# Patient Record
Sex: FEMALE | Race: WHITE | NOT HISPANIC OR LATINO | Employment: UNEMPLOYED | URBAN - NONMETROPOLITAN AREA
[De-identification: names, ages, dates, MRNs, and addresses within clinical notes are randomized per-mention and may not be internally consistent; named-entity substitution may affect disease eponyms.]

---

## 2023-08-23 PROBLEM — I10 ESSENTIAL HYPERTENSION: Status: ACTIVE | Noted: 2023-08-23

## 2023-08-23 PROBLEM — M54.16 RADICULOPATHY, LUMBAR REGION: Status: ACTIVE | Noted: 2023-08-23

## 2023-08-23 PROBLEM — R11.0 NAUSEA IN ADULT: Status: ACTIVE | Noted: 2023-08-23

## 2023-08-23 PROBLEM — E05.90 HYPERTHYROIDISM: Status: ACTIVE | Noted: 2023-08-23

## 2023-08-23 PROBLEM — N28.9 DECREASED RENAL FUNCTION: Status: ACTIVE | Noted: 2023-08-23

## 2023-08-23 PROBLEM — K59.09 CHRONIC CONSTIPATION: Status: ACTIVE | Noted: 2023-08-23

## 2023-08-23 PROBLEM — E78.5 HYPERLIPIDEMIA: Status: ACTIVE | Noted: 2023-08-23

## 2023-08-23 PROBLEM — M47.816 SPONDYLOSIS WITHOUT MYELOPATHY OR RADICULOPATHY, LUMBAR REGION: Status: ACTIVE | Noted: 2023-08-23

## 2023-08-23 PROBLEM — R73.03 PREDIABETES: Status: ACTIVE | Noted: 2023-08-23

## 2023-08-23 PROBLEM — R41.89 COGNITIVE IMPAIRMENT: Status: ACTIVE | Noted: 2023-08-23

## 2023-08-23 PROBLEM — N95.2 ATROPHIC VULVOVAGINITIS: Status: ACTIVE | Noted: 2023-08-23

## 2023-08-23 RX ORDER — METFORMIN HYDROCHLORIDE 500 MG/1
500 TABLET ORAL EVERY MORNING
COMMUNITY
End: 2023-10-16 | Stop reason: WASHOUT

## 2023-08-23 RX ORDER — ACETAMINOPHEN 500 MG
1 TABLET ORAL 2 TIMES DAILY
COMMUNITY
End: 2023-10-16 | Stop reason: WASHOUT

## 2023-08-23 RX ORDER — GLIPIZIDE 2.5 MG/1
2.5 TABLET, EXTENDED RELEASE ORAL EVERY EVENING
COMMUNITY
End: 2023-10-16 | Stop reason: WASHOUT

## 2023-08-23 RX ORDER — TRAMADOL HYDROCHLORIDE 50 MG/1
100 TABLET ORAL 3 TIMES DAILY PRN
COMMUNITY
End: 2024-03-28 | Stop reason: WASHOUT

## 2023-08-23 RX ORDER — METHIMAZOLE 5 MG/1
5 TABLET ORAL
COMMUNITY
Start: 2019-01-08 | End: 2023-10-16 | Stop reason: WASHOUT

## 2023-08-23 RX ORDER — METHIMAZOLE 5 MG/1
5 TABLET ORAL 3 TIMES DAILY
COMMUNITY
End: 2024-05-08

## 2023-08-23 RX ORDER — PRAVASTATIN SODIUM 80 MG/1
80 TABLET ORAL DAILY
COMMUNITY
End: 2024-05-08

## 2023-08-23 RX ORDER — DONEPEZIL HYDROCHLORIDE 5 MG/1
5 TABLET, FILM COATED ORAL NIGHTLY
COMMUNITY
End: 2023-10-16 | Stop reason: WASHOUT

## 2023-08-23 RX ORDER — NAPROXEN SODIUM 220 MG/1
1 TABLET, FILM COATED ORAL DAILY
COMMUNITY
End: 2024-04-29 | Stop reason: WASHOUT

## 2023-08-23 RX ORDER — METFORMIN HYDROCHLORIDE 500 MG/1
1000 TABLET ORAL EVERY EVENING
COMMUNITY
End: 2023-10-16 | Stop reason: WASHOUT

## 2023-08-23 RX ORDER — LISINOPRIL 20 MG/1
20 TABLET ORAL DAILY
COMMUNITY
Start: 2010-09-01 | End: 2023-10-16 | Stop reason: SDUPTHER

## 2023-08-23 RX ORDER — PANTOPRAZOLE SODIUM 40 MG/1
40 TABLET, DELAYED RELEASE ORAL
COMMUNITY
Start: 2021-08-25 | End: 2023-10-16 | Stop reason: WASHOUT

## 2023-08-23 RX ORDER — ONDANSETRON 4 MG/1
4 TABLET, ORALLY DISINTEGRATING ORAL EVERY 6 HOURS PRN
COMMUNITY
Start: 2021-07-27 | End: 2023-10-16 | Stop reason: WASHOUT

## 2023-10-11 NOTE — PROGRESS NOTES
HPI:       Hypertension:          The patient has no complaints.  Labs done in May were normal         The patients cardiovascular risk factors include:         Cardiac Risk Factors  Age > 45-male, > 55-female:  YES  +1   Smoking:   NO   Sig. family hx of CHD*:  NO   Hypertension:   YES  +1   Diabetes:   NO   HDL < 35:   NO   HDL > 59:   YES  -1   Total: 1     *- Sig. family h/o CHD per NCEP = MI or sudden death at <54yo in   father or other 1st-degree male relative, or <64yo in mother or   other 1st-degree female relative           The patients adherence to the treatment regimen is Excellent         Responses to the medications has been Excellent    Hyperlipidemia:   The patient does not use medications that may worsen dyslipidemias (corticosteroids, progestins, anabolic steroids, diuretics, beta-blockers, amiodarone, cyclosporine, olanzapine). The patient exercises infrequently.  The patient is not known to have coexisting coronary artery disease.    MEDICATIONS:   Current Outpatient Medications   Medication Sig Dispense Refill    aspirin 81 mg chewable tablet Chew 1 tablet (81 mg) once daily.      donepezil (Aricept) 10 mg tablet Take by mouth once daily at bedtime.      methIMAzole (Tapazole) 5 mg tablet Take 1 tablet (5 mg) by mouth 3 times a day.      pravastatin (Pravachol) 80 mg tablet Take 1 tablet (80 mg) by mouth once daily. for 90 day(s)      traMADol (Ultram) 50 mg tablet Take 2 tablets (100 mg) by mouth 3 times a day as needed.      lisinopril 20 mg tablet Take 1 tablet (20 mg) by mouth once daily. for 90 days 90 tablet 1     No current facility-administered medications for this visit.     ALLERGIES:   Allergies   Allergen Reactions    Codeine Other     GI SYMPTONS, pills cause GI upset    Atorvastatin Other     MUSCLE WEAKNESS, myalgias    Gemfibrozil Other     Muscle Pain/Myalgia, myalgias    Niacin Hives and Unknown    Simvastatin Other     myalgias     MEDICAL HISTORY:   Past Medical History:    Diagnosis Date    Atrophic vulvovaginitis     Cognitive impairment     Decreased renal function     Dyslipidemia     History of type 2 diabetes mellitus 04/2022    Hypertension     Hyperthyroidism     Long term (current) use of opiate analgesic     PM    Osteopenia     Other chronic pain     PM    Prediabetes     Spondylosis without myelopathy or radiculopathy, lumbar region     PM    Thyroid nodule 12/2018    benign per fine needle bx     FAMILY HISTORY:   Family History   Problem Relation Name Age of Onset    Lung cancer Mother      COPD Brother      Arthritis Brother       SOCIAL HISTORY:   Social History     Tobacco Use    Smoking status: Never    Smokeless tobacco: Never   Vaping Use    Vaping Use: Never used   Substance Use Topics    Alcohol use: Never    Drug use: Never         ROS:      CONSTITUTION:  alert and oriented     OPHTHALMOLOGY:          no Change in vision.         CARDIOLOGY:          no Chest pain.  no Dyspnea on exertion.  no Shortness of breath.         ENDOCRINOLOGY:          no Excessive thirst.  no Excessive urination.  no Fatigue.  no Skin changes.  no Weight gain.  no Weight loss.         SKIN:          no Healing problems.         NEUROLOGY:          no Loss of sensation in specific body area.  no Burning pain in feet.  no Peripheral neuropathy.  no Tingling/numbness.    LABS:   Lab Results   Component Value Date    GLUCOSE 81 05/01/2023    CALCIUM 9.2 05/01/2023     05/01/2023    K 4.1 05/01/2023    CO2 23 (L) 05/01/2023     05/01/2023    BUN 16 05/01/2023    CREATININE 1.0 05/01/2023      Lab Results   Component Value Date    CHOL 192 05/01/2023    CHOL 161 07/18/2022    CHOL 202 (H) 10/11/2021     Lab Results   Component Value Date    HDL 60 05/01/2023    HDL 51 07/18/2022    HDL 51 10/11/2021     Lab Results   Component Value Date    LDLCALC 72 05/01/2023    LDLCALC 82 07/18/2022    LDLCALC 73 10/11/2021     Lab Results   Component Value Date    TRIG 301 (H) 05/01/2023     TRIG 139 07/18/2022    TRIG 392 (H) 10/11/2021     Lab Results   Component Value Date    ALBUR 12 07/18/2022      Lab Results   Component Value Date    HGBA1C 5.8 05/01/2023         VITAL SIGNS:  /76   Pulse 72   Wt 64.6 kg (142 lb 6.4 oz)   BMI 24.44 kg/m²     General Appearance: awake, alert, oriented, in no acute distress  Lungs: Normal expansion.  Clear to auscultation.  No rales, rhonchi, or wheezing.  Heart: Heart sounds are normal.  Regular rate and rhythm without murmur, gallop or rub.  Extremities: Extremities warm to touch, pink, with no edema.  Neurologic: Alert and oriented x 3, gait normal., reflexes normal and symmetric, strength and  sensation grossly normal    Constance was seen today for hypertension.  Diagnoses and all orders for this visit:  Essential hypertension (Primary)  -     lisinopril 20 mg tablet; Take 1 tablet (20 mg) by mouth once daily. for 90 days  Mixed hyperlipidemia  Comments:  cont pravastatin  Prediabetes  Comments:  cont diet /exercise  Stage 3a chronic kidney disease (CMS/HCC)  Comments:  cont lisinopril

## 2023-10-16 ENCOUNTER — OFFICE VISIT (OUTPATIENT)
Dept: PRIMARY CARE | Facility: CLINIC | Age: 83
End: 2023-10-16
Payer: MEDICARE

## 2023-10-16 VITALS
BODY MASS INDEX: 24.44 KG/M2 | DIASTOLIC BLOOD PRESSURE: 76 MMHG | WEIGHT: 142.4 LBS | HEART RATE: 72 BPM | SYSTOLIC BLOOD PRESSURE: 128 MMHG

## 2023-10-16 DIAGNOSIS — I10 ESSENTIAL HYPERTENSION: Primary | ICD-10-CM

## 2023-10-16 DIAGNOSIS — N18.31 STAGE 3A CHRONIC KIDNEY DISEASE (MULTI): ICD-10-CM

## 2023-10-16 DIAGNOSIS — R73.03 PREDIABETES: ICD-10-CM

## 2023-10-16 DIAGNOSIS — E78.2 MIXED HYPERLIPIDEMIA: ICD-10-CM

## 2023-10-16 PROCEDURE — 99214 OFFICE O/P EST MOD 30 MIN: CPT | Performed by: FAMILY MEDICINE

## 2023-10-16 PROCEDURE — 1126F AMNT PAIN NOTED NONE PRSNT: CPT | Performed by: FAMILY MEDICINE

## 2023-10-16 PROCEDURE — 99213 OFFICE O/P EST LOW 20 MIN: CPT | Performed by: FAMILY MEDICINE

## 2023-10-16 PROCEDURE — 3074F SYST BP LT 130 MM HG: CPT | Performed by: FAMILY MEDICINE

## 2023-10-16 PROCEDURE — 3078F DIAST BP <80 MM HG: CPT | Performed by: FAMILY MEDICINE

## 2023-10-16 PROCEDURE — 1160F RVW MEDS BY RX/DR IN RCRD: CPT | Performed by: FAMILY MEDICINE

## 2023-10-16 PROCEDURE — 1170F FXNL STATUS ASSESSED: CPT | Performed by: FAMILY MEDICINE

## 2023-10-16 PROCEDURE — 1036F TOBACCO NON-USER: CPT | Performed by: FAMILY MEDICINE

## 2023-10-16 PROCEDURE — 1159F MED LIST DOCD IN RCRD: CPT | Performed by: FAMILY MEDICINE

## 2023-10-16 RX ORDER — LISINOPRIL 20 MG/1
20 TABLET ORAL DAILY
Qty: 90 TABLET | Refills: 1 | Status: SHIPPED | OUTPATIENT
Start: 2023-10-16 | End: 2024-04-04 | Stop reason: SDUPTHER

## 2023-10-16 RX ORDER — DONEPEZIL HYDROCHLORIDE 10 MG/1
TABLET, FILM COATED ORAL NIGHTLY
COMMUNITY
Start: 2023-09-22 | End: 2023-12-19 | Stop reason: SDUPTHER

## 2023-10-16 ASSESSMENT — ACTIVITIES OF DAILY LIVING (ADL)
GROOMING: INDEPENDENT
BATHING: INDEPENDENT
JUDGMENT_ADEQUATE_SAFELY_COMPLETE_DAILY_ACTIVITIES: YES
FEEDING YOURSELF: INDEPENDENT
DRESSING YOURSELF: INDEPENDENT
WALKS IN HOME: INDEPENDENT
HEARING - RIGHT EAR: HEARING AID
HEARING - LEFT EAR: HEARING AID
TOILETING: INDEPENDENT
PATIENT'S MEMORY ADEQUATE TO SAFELY COMPLETE DAILY ACTIVITIES?: YES

## 2023-10-16 ASSESSMENT — PAIN SCALES - GENERAL: PAINLEVEL: 0-NO PAIN

## 2023-10-16 ASSESSMENT — PATIENT HEALTH QUESTIONNAIRE - PHQ9
2. FEELING DOWN, DEPRESSED OR HOPELESS: NOT AT ALL
1. LITTLE INTEREST OR PLEASURE IN DOING THINGS: NOT AT ALL
SUM OF ALL RESPONSES TO PHQ9 QUESTIONS 1 AND 2: 0

## 2023-12-19 DIAGNOSIS — R41.89 COGNITIVE IMPAIRMENT: Primary | ICD-10-CM

## 2023-12-19 RX ORDER — DONEPEZIL HYDROCHLORIDE 10 MG/1
10 TABLET, FILM COATED ORAL NIGHTLY
Qty: 30 TABLET | Refills: 11 | Status: SHIPPED | OUTPATIENT
Start: 2023-12-19

## 2023-12-19 NOTE — TELEPHONE ENCOUNTER
Pt calls for a refill of [employer], she will be going out of town- send for 30 days with refills

## 2024-03-28 PROBLEM — N18.31 STAGE 3A CHRONIC KIDNEY DISEASE (MULTI): Status: ACTIVE | Noted: 2023-08-23

## 2024-03-28 NOTE — PROGRESS NOTES
HPI:       Hypertension:          The patient has no complaints.          The patients cardiovascular risk factors include:         Cardiac Risk Factors  Age > 45-male, > 55-female:  YES  +1   Smoking:   NO   Sig. family hx of CHD*:  NO   Hypertension:   YES  +1   Diabetes:   NO   HDL < 35:   NO   HDL > 59:   YES  -1   Total: 1     *- Sig. family h/o CHD per NCEP = MI or sudden death at <54yo in   father or other 1st-degree male relative, or <64yo in mother or   other 1st-degree female relative           The patients adherence to the treatment regimen is Excellent         Responses to the medications has been Excellent    Hyperlipidemia:   The patient does not use medications that may worsen dyslipidemias (corticosteroids, progestins, anabolic steroids, diuretics, beta-blockers, amiodarone, cyclosporine, olanzapine). The patient exercises infrequently.  The patient is not known to have coexisting coronary artery disease.  Pt is on a statin.    Also has cognitive dysfunction and is on aricept.  Short term memory is awful and long term memories starting to be affected.  MMSE today is 27/30.  She still drives in [facility] alone.  No driving at night.  Niece is there weekly but pt will forget she's there sometimes.    MEDICATIONS:   Current Outpatient Medications   Medication Sig Dispense Refill    donepezil (Aricept) 10 mg tablet Take 1 tablet (10 mg) by mouth once daily at bedtime. 30 tablet 11    methIMAzole (Tapazole) 5 mg tablet Take 1 tablet (5 mg) by mouth 3 times a day.      pravastatin (Pravachol) 80 mg tablet Take 1 tablet (80 mg) by mouth once daily. for 90 day(s)      aspirin 81 mg chewable tablet Chew 1 tablet (81 mg) once daily.      lisinopril 20 mg tablet Take 1 tablet (20 mg) by mouth once daily. for 90 days 90 tablet 1    memantine (Namenda) 5 mg tablet Take 1 tablet (5 mg) by mouth once daily. After 1 week she can take it twice a day. 30 tablet 0     No current facility-administered medications for  "this visit.     ALLERGIES:   Allergies   Allergen Reactions    Codeine Other     GI SYMPTONS, pills cause GI upset    Atorvastatin Other     MUSCLE WEAKNESS, myalgias    Gemfibrozil Other     Muscle Pain/Myalgia, myalgias    Niacin Hives and Unknown    Simvastatin Other     myalgias     MEDICAL HISTORY:   Past Medical History:   Diagnosis Date    Atrophic vulvovaginitis     Cognitive impairment     Decreased renal function     Dyslipidemia     History of type 2 diabetes mellitus 04/2022    Hypertension     Hyperthyroidism     Long term (current) use of opiate analgesic     PM    Osteopenia     Other chronic pain     PM    Prediabetes     Spondylosis without myelopathy or radiculopathy, lumbar region     PM    Thyroid nodule 12/2018    benign per fine needle bx     FAMILY HISTORY:   Family History   Problem Relation Name Age of Onset    Lung cancer Mother      COPD Brother      Arthritis Brother      No Known Problems Daughter      No Known Problems Son      No Known Problems Son       SOCIAL HISTORY:   Social History     Tobacco Use    Smoking status: Never    Smokeless tobacco: Never   Vaping Use    Vaping Use: Never used   Substance Use Topics    Alcohol use: Never    Drug use: Never         ROS:      CONSTITUTION:  alert and oriented     OPHTHALMOLOGY:          no Change in vision.         CARDIOLOGY:          no Chest pain.  no Dyspnea on exertion.  no Shortness of breath.         ENDOCRINOLOGY:          no Excessive thirst.  no Excessive urination.  no Fatigue.  no Skin changes.  no Weight gain.  no Weight loss.         SKIN:          no Healing problems.         NEUROLOGY:          no Loss of sensation in specific body area.  no Burning pain in feet.  no Peripheral neuropathy.  no Tingling/numbness.    LABS: due        VITAL SIGNS:  /68   Pulse 104   Temp 36.5 °C (97.7 °F)   Ht 1.626 m (5' 4\")   Wt 65.8 kg (145 lb)   SpO2 98%   BMI 24.89 kg/m²     General Appearance: awake, alert, oriented, in no " acute distress  Lungs: Normal expansion.  Clear to auscultation.  No rales, rhonchi, or wheezing.  Heart: Heart sounds are normal.  Regular rate and rhythm without murmur, gallop or rub.  Extremities: Extremities warm to touch, pink, with no edema.  Neurologic: Alert and oriented x 3, gait normal., reflexes normal and symmetric, strength and  sensation grossly normal  MMSE 27/30.    Constance was seen today for hypertension, memory loss and diabetes.  Diagnoses and all orders for this visit:  Essential hypertension (Primary)  -     Cancel: Basic Metabolic Panel; Future  -     lisinopril 20 mg tablet; Take 1 tablet (20 mg) by mouth once daily. for 90 days  -     Comprehensive Metabolic Panel; Future  Mixed hyperlipidemia  Comments:  cont pravastatin  Orders:  -     Lipid Panel; Future  Stage 3a chronic kidney disease (CMS/HCC)  -     Albumin , Urine Random; Future  -     Urinalysis with Reflex Microscopic; Future  Prediabetes  -     Hemoglobin A1C; Future  Hyperthyroidism  -     TSH with reflex to Free T4 if abnormal; Future  Cognitive impairment  Comments:  cont aricept  Orders:  -     memantine (Namenda) 5 mg tablet; Take 1 tablet (5 mg) by mouth once daily. After 1 week she can take it twice a day.

## 2024-04-04 ENCOUNTER — OFFICE VISIT (OUTPATIENT)
Dept: PRIMARY CARE | Facility: CLINIC | Age: 84
End: 2024-04-04
Payer: MEDICARE

## 2024-04-04 ENCOUNTER — LAB (OUTPATIENT)
Dept: LAB | Facility: LAB | Age: 84
End: 2024-04-04
Payer: MEDICARE

## 2024-04-04 VITALS
HEART RATE: 104 BPM | WEIGHT: 145 LBS | DIASTOLIC BLOOD PRESSURE: 68 MMHG | HEIGHT: 64 IN | BODY MASS INDEX: 24.75 KG/M2 | TEMPERATURE: 97.7 F | SYSTOLIC BLOOD PRESSURE: 118 MMHG | OXYGEN SATURATION: 98 %

## 2024-04-04 DIAGNOSIS — R73.03 PREDIABETES: ICD-10-CM

## 2024-04-04 DIAGNOSIS — E05.90 HYPERTHYROIDISM: ICD-10-CM

## 2024-04-04 DIAGNOSIS — R41.89 COGNITIVE IMPAIRMENT: ICD-10-CM

## 2024-04-04 DIAGNOSIS — I10 ESSENTIAL HYPERTENSION: ICD-10-CM

## 2024-04-04 DIAGNOSIS — I10 ESSENTIAL HYPERTENSION: Primary | ICD-10-CM

## 2024-04-04 DIAGNOSIS — E78.2 MIXED HYPERLIPIDEMIA: ICD-10-CM

## 2024-04-04 DIAGNOSIS — N18.31 STAGE 3A CHRONIC KIDNEY DISEASE (MULTI): ICD-10-CM

## 2024-04-04 LAB
ALBUMIN SERPL BCP-MCNC: 4.2 G/DL (ref 3.4–5)
ALP SERPL-CCNC: 83 U/L (ref 33–136)
ALT SERPL W P-5'-P-CCNC: 11 U/L (ref 7–45)
ANION GAP SERPL CALC-SCNC: 12 MMOL/L (ref 10–20)
APPEARANCE UR: ABNORMAL
AST SERPL W P-5'-P-CCNC: 14 U/L (ref 9–39)
BACTERIA #/AREA URNS AUTO: ABNORMAL /HPF
BILIRUB SERPL-MCNC: 1.4 MG/DL (ref 0–1.2)
BILIRUB UR STRIP.AUTO-MCNC: NEGATIVE MG/DL
BUN SERPL-MCNC: 12 MG/DL (ref 6–23)
CALCIUM SERPL-MCNC: 9.3 MG/DL (ref 8.6–10.3)
CHLORIDE SERPL-SCNC: 106 MMOL/L (ref 98–107)
CHOLEST SERPL-MCNC: 175 MG/DL (ref 0–199)
CHOLESTEROL/HDL RATIO: 3.2
CO2 SERPL-SCNC: 26 MMOL/L (ref 21–32)
COLOR UR: ABNORMAL
CREAT SERPL-MCNC: 0.9 MG/DL (ref 0.5–1.05)
EGFRCR SERPLBLD CKD-EPI 2021: 63 ML/MIN/1.73M*2
GLUCOSE SERPL-MCNC: 107 MG/DL (ref 74–99)
GLUCOSE UR STRIP.AUTO-MCNC: NEGATIVE MG/DL
HDLC SERPL-MCNC: 54.8 MG/DL
HYALINE CASTS #/AREA URNS AUTO: ABNORMAL /LPF
KETONES UR STRIP.AUTO-MCNC: ABNORMAL MG/DL
LDLC SERPL CALC-MCNC: 86 MG/DL
LEUKOCYTE ESTERASE UR QL STRIP.AUTO: ABNORMAL
MUCOUS THREADS #/AREA URNS AUTO: ABNORMAL /LPF
NITRITE UR QL STRIP.AUTO: NEGATIVE
NON HDL CHOLESTEROL: 120 MG/DL (ref 0–149)
PH UR STRIP.AUTO: 5 [PH]
POTASSIUM SERPL-SCNC: 4.1 MMOL/L (ref 3.5–5.3)
PROT SERPL-MCNC: 6.6 G/DL (ref 6.4–8.2)
PROT UR STRIP.AUTO-MCNC: NEGATIVE MG/DL
RBC # UR STRIP.AUTO: NEGATIVE /UL
RBC #/AREA URNS AUTO: ABNORMAL /HPF
SODIUM SERPL-SCNC: 140 MMOL/L (ref 136–145)
SP GR UR STRIP.AUTO: 1.02
SQUAMOUS #/AREA URNS AUTO: ABNORMAL /HPF
TRIGL SERPL-MCNC: 173 MG/DL (ref 0–149)
TSH SERPL-ACNC: 3.36 MIU/L (ref 0.44–3.98)
UROBILINOGEN UR STRIP.AUTO-MCNC: 2 MG/DL
VLDL: 35 MG/DL (ref 0–40)
WBC #/AREA URNS AUTO: ABNORMAL /HPF

## 2024-04-04 PROCEDURE — 99214 OFFICE O/P EST MOD 30 MIN: CPT | Performed by: FAMILY MEDICINE

## 2024-04-04 PROCEDURE — 1158F ADVNC CARE PLAN TLK DOCD: CPT | Performed by: FAMILY MEDICINE

## 2024-04-04 PROCEDURE — 1157F ADVNC CARE PLAN IN RCRD: CPT | Performed by: FAMILY MEDICINE

## 2024-04-04 PROCEDURE — 83036 HEMOGLOBIN GLYCOSYLATED A1C: CPT

## 2024-04-04 PROCEDURE — 82043 UR ALBUMIN QUANTITATIVE: CPT

## 2024-04-04 PROCEDURE — 82570 ASSAY OF URINE CREATININE: CPT

## 2024-04-04 PROCEDURE — 1036F TOBACCO NON-USER: CPT | Performed by: FAMILY MEDICINE

## 2024-04-04 PROCEDURE — 3074F SYST BP LT 130 MM HG: CPT | Performed by: FAMILY MEDICINE

## 2024-04-04 PROCEDURE — 36415 COLL VENOUS BLD VENIPUNCTURE: CPT

## 2024-04-04 PROCEDURE — 3078F DIAST BP <80 MM HG: CPT | Performed by: FAMILY MEDICINE

## 2024-04-04 PROCEDURE — 1159F MED LIST DOCD IN RCRD: CPT | Performed by: FAMILY MEDICINE

## 2024-04-04 PROCEDURE — 1126F AMNT PAIN NOTED NONE PRSNT: CPT | Performed by: FAMILY MEDICINE

## 2024-04-04 PROCEDURE — 1123F ACP DISCUSS/DSCN MKR DOCD: CPT | Performed by: FAMILY MEDICINE

## 2024-04-04 RX ORDER — LISINOPRIL 20 MG/1
20 TABLET ORAL DAILY
Qty: 90 TABLET | Refills: 1 | Status: SHIPPED | OUTPATIENT
Start: 2024-04-04

## 2024-04-04 RX ORDER — MEMANTINE HYDROCHLORIDE 5 MG/1
5 TABLET ORAL DAILY
Qty: 30 TABLET | Refills: 0 | Status: SHIPPED | OUTPATIENT
Start: 2024-04-04 | End: 2024-04-29 | Stop reason: WASHOUT

## 2024-04-04 ASSESSMENT — PAIN SCALES - GENERAL: PAINLEVEL: 0-NO PAIN

## 2024-04-05 LAB
CREAT UR-MCNC: 177.3 MG/DL (ref 20–320)
EST. AVERAGE GLUCOSE BLD GHB EST-MCNC: 117 MG/DL
HBA1C MFR BLD: 5.7 %
MICROALBUMIN UR-MCNC: 8.2 MG/L
MICROALBUMIN/CREAT UR: 4.6 UG/MG CREAT

## 2024-04-05 NOTE — RESULT ENCOUNTER NOTE
1. Hga1c shows stable prediabetes; ney 1 yr  2. Lipids are fine; ney 1 yr; no change to statin dose  3.  Cmp is normal; ney 1yr  4.  U/a is normal but micro shows some blood in it but most likely due to urine being very concentrated.  Needs to drink more fluids.  A/c ratio is normal; will ney 1 yr  5. Tsh is normal; ney 1 yr

## 2024-04-23 RX ORDER — TRIAMCINOLONE ACETONIDE 1 MG/G
CREAM TOPICAL EVERY 12 HOURS
COMMUNITY
Start: 2022-03-21 | End: 2024-04-29 | Stop reason: WASHOUT

## 2024-04-23 RX ORDER — DOCUSATE SODIUM 100 MG/1
1 CAPSULE, LIQUID FILLED ORAL EVERY 12 HOURS
COMMUNITY
End: 2024-04-29 | Stop reason: WASHOUT

## 2024-04-29 ENCOUNTER — OFFICE VISIT (OUTPATIENT)
Dept: PRIMARY CARE | Facility: CLINIC | Age: 84
End: 2024-04-29
Payer: MEDICARE

## 2024-04-29 ENCOUNTER — LAB (OUTPATIENT)
Dept: LAB | Facility: LAB | Age: 84
End: 2024-04-29
Payer: MEDICARE

## 2024-04-29 VITALS
OXYGEN SATURATION: 99 % | SYSTOLIC BLOOD PRESSURE: 124 MMHG | DIASTOLIC BLOOD PRESSURE: 70 MMHG | BODY MASS INDEX: 25.13 KG/M2 | HEART RATE: 67 BPM | WEIGHT: 146.4 LBS

## 2024-04-29 DIAGNOSIS — Z11.59 NEED FOR HEPATITIS C SCREENING TEST: ICD-10-CM

## 2024-04-29 DIAGNOSIS — Z00.00 ROUTINE GENERAL MEDICAL EXAMINATION AT HEALTH CARE FACILITY: Primary | ICD-10-CM

## 2024-04-29 DIAGNOSIS — L29.9 CHRONIC PRURITUS: ICD-10-CM

## 2024-04-29 LAB — ERYTHROCYTE [SEDIMENTATION RATE] IN BLOOD BY WESTERGREN METHOD: 6 MM/H (ref 0–30)

## 2024-04-29 PROCEDURE — 36415 COLL VENOUS BLD VENIPUNCTURE: CPT

## 2024-04-29 PROCEDURE — 99397 PER PM REEVAL EST PAT 65+ YR: CPT | Performed by: FAMILY MEDICINE

## 2024-04-29 PROCEDURE — 86803 HEPATITIS C AB TEST: CPT

## 2024-04-29 PROCEDURE — 3074F SYST BP LT 130 MM HG: CPT | Performed by: FAMILY MEDICINE

## 2024-04-29 PROCEDURE — 1126F AMNT PAIN NOTED NONE PRSNT: CPT | Performed by: FAMILY MEDICINE

## 2024-04-29 PROCEDURE — 1157F ADVNC CARE PLAN IN RCRD: CPT | Performed by: FAMILY MEDICINE

## 2024-04-29 PROCEDURE — 1160F RVW MEDS BY RX/DR IN RCRD: CPT | Performed by: FAMILY MEDICINE

## 2024-04-29 PROCEDURE — 1159F MED LIST DOCD IN RCRD: CPT | Performed by: FAMILY MEDICINE

## 2024-04-29 PROCEDURE — 3078F DIAST BP <80 MM HG: CPT | Performed by: FAMILY MEDICINE

## 2024-04-29 PROCEDURE — 99215 OFFICE O/P EST HI 40 MIN: CPT | Performed by: FAMILY MEDICINE

## 2024-04-29 PROCEDURE — 1170F FXNL STATUS ASSESSED: CPT | Performed by: FAMILY MEDICINE

## 2024-04-29 PROCEDURE — 1036F TOBACCO NON-USER: CPT | Performed by: FAMILY MEDICINE

## 2024-04-29 PROCEDURE — G0439 PPPS, SUBSEQ VISIT: HCPCS | Performed by: FAMILY MEDICINE

## 2024-04-29 PROCEDURE — 1123F ACP DISCUSS/DSCN MKR DOCD: CPT | Performed by: FAMILY MEDICINE

## 2024-04-29 RX ORDER — CETIRIZINE HYDROCHLORIDE 10 MG/1
TABLET, CHEWABLE ORAL DAILY
COMMUNITY
End: 2024-04-29 | Stop reason: DRUGHIGH

## 2024-04-29 RX ORDER — CETIRIZINE HYDROCHLORIDE 10 MG/1
10 TABLET, CHEWABLE ORAL 2 TIMES DAILY
Qty: 60 TABLET | Refills: 0 | Status: SHIPPED | OUTPATIENT
Start: 2024-04-29

## 2024-04-29 ASSESSMENT — ACTIVITIES OF DAILY LIVING (ADL)
GROCERY_SHOPPING: INDEPENDENT
BATHING: INDEPENDENT
TAKING_MEDICATION: INDEPENDENT
DRESSING: INDEPENDENT
MANAGING_FINANCES: TOTAL CARE
DOING_HOUSEWORK: INDEPENDENT

## 2024-04-29 ASSESSMENT — PATIENT HEALTH QUESTIONNAIRE - PHQ9
SUM OF ALL RESPONSES TO PHQ9 QUESTIONS 1 AND 2: 0
2. FEELING DOWN, DEPRESSED OR HOPELESS: NOT AT ALL
1. LITTLE INTEREST OR PLEASURE IN DOING THINGS: NOT AT ALL

## 2024-04-29 ASSESSMENT — PAIN SCALES - GENERAL: PAINLEVEL: 0-NO PAIN

## 2024-04-29 NOTE — PROGRESS NOTES
Subjective   Reason for Visit: Constance Beltran is an 84 y.o. female here for a Medicare Wellness visit.     Past Medical, Surgical, and Family History reviewed and updated in chart.    Reviewed all medications by prescribing practitioner or clinical pharmacist (such as prescriptions, OTCs, herbal therapies and supplements) and documented in the medical record.    HPI pt  here for subsequent medicare physical.  Also still with chronic itching and taking zyrtec twice a day; benadryl didn't work; hydroxyzine didn't work.  Won't see derm.  Labwork all normal     Patient Care Team:  July Lao MD as PCP - General (Family Medicine)  July Lao MD as PCP - [employer] PCP     Review of Systems n/a    Objective   Vitals:  /70   Pulse 67   Wt 66.4 kg (146 lb 6.4 oz)   SpO2 99%   BMI 25.13 kg/m²       Physical Exam  Vitals reviewed.   Constitutional:       Appearance: Normal appearance.   HENT:      Head: Normocephalic and atraumatic.      Right Ear: Tympanic membrane, ear canal and external ear normal.      Left Ear: Tympanic membrane, ear canal and external ear normal.      Mouth/Throat:      Mouth: Mucous membranes are moist.      Pharynx: Oropharynx is clear.   Eyes:      Extraocular Movements: Extraocular movements intact.      Pupils: Pupils are equal, round, and reactive to light.   Cardiovascular:      Rate and Rhythm: Normal rate and regular rhythm.      Heart sounds: Normal heart sounds.   Pulmonary:      Effort: Pulmonary effort is normal.      Breath sounds: Normal breath sounds.   Musculoskeletal:         General: Normal range of motion.   Skin:     General: Skin is warm.      Findings: No lesion or rash.   Neurological:      General: No focal deficit present.      Mental Status: She is alert and oriented to person, place, and time.      Cranial Nerves: Cranial nerves 2-12 are intact.      Sensory: Sensation is intact.      Motor: Motor function is intact.      Coordination:  Coordination is intact.      Deep Tendon Reflexes: Reflexes are normal and symmetric.   Psychiatric:         Behavior: Behavior normal.         Judgment: Judgment normal.         Assessment/Plan   Problem List Items Addressed This Visit    None  Visit Diagnoses       Routine general medical examination at health care facility    -  Primary    Relevant Orders    1 Year Follow Up In Primary Care - Wellness Exam    Need for hepatitis C screening test        Relevant Orders    Hepatitis C antibody    Chronic pruritus        Relevant Orders    Sedimentation Rate        Family for now defers HIV testing though pt has had tattoos in the past but no blood transfusions

## 2024-04-30 LAB — HCV AB SER QL: NONREACTIVE

## 2024-04-30 NOTE — RESULT ENCOUNTER NOTE
Hep c and sed rate are normal.  As for her itching, we could try hydroxyzine for it but the med could make her drowsy.

## 2024-05-08 DIAGNOSIS — E05.90 HYPERTHYROIDISM: ICD-10-CM

## 2024-05-08 DIAGNOSIS — E78.2 MIXED HYPERLIPIDEMIA: Primary | ICD-10-CM

## 2024-05-08 RX ORDER — METHIMAZOLE 5 MG/1
5 TABLET ORAL
Qty: 180 TABLET | Refills: 3 | Status: SHIPPED | OUTPATIENT
Start: 2024-05-08

## 2024-05-08 RX ORDER — PRAVASTATIN SODIUM 80 MG/1
80 TABLET ORAL DAILY
Qty: 90 TABLET | Refills: 3 | Status: SHIPPED | OUTPATIENT
Start: 2024-05-08

## 2024-06-17 PROBLEM — L29.9 CHRONIC PRURITUS: Status: ACTIVE | Noted: 2024-06-17

## 2024-06-17 NOTE — PROGRESS NOTES
Subjective   Patient ID: Constance Beltran is a 84 y.o. female who presents for No chief complaint on file..    HPI   Pt come in c.o of a rash all over her body for the past week.  She has chronic itching for which she takes zyrtec.  She has failed hydroxyzine and benadryl in the past.  She refuses to see derm. Labwork all normal regarding itching.      Past Medical History:   Diagnosis Date    Atrophic vulvovaginitis     Cognitive impairment     Decreased renal function     Dyslipidemia     History of type 2 diabetes mellitus 04/2022    Hypertension     Hyperthyroidism     Long term (current) use of opiate analgesic     PM    Osteopenia     Other chronic pain     PM    Prediabetes     Spondylosis without myelopathy or radiculopathy, lumbar region     PM    Thyroid nodule 12/2018    benign per fine needle bx     Current Outpatient Medications   Medication Sig Dispense Refill    cetirizine (ZyrTEC) 10 mg chewable tablet Chew 1 tablet (10 mg) 2 times a day. 60 tablet 0    donepezil (Aricept) 10 mg tablet Take 1 tablet (10 mg) by mouth once daily at bedtime. 30 tablet 11    lisinopril 20 mg tablet Take 1 tablet (20 mg) by mouth once daily. for 90 days 90 tablet 1    methIMAzole (Tapazole) 5 mg tablet TAKE ONE TABLET BY MOUTH TWICE A DAY WITH FOOD 180 tablet 3    pravastatin (Pravachol) 80 mg tablet TAKE ONE TABLET BY MOUTH EVERY DAY 90 tablet 3     No current facility-administered medications for this visit.       Review of Systems see hpi    Objective   There were no vitals taken for this visit.    Physical Exam    Assessment/Plan   {Assess/PlanSmartLinks:[id number]}

## 2024-06-18 ENCOUNTER — OFFICE VISIT (OUTPATIENT)
Dept: PRIMARY CARE | Facility: CLINIC | Age: 84
End: 2024-06-18
Payer: MEDICARE

## 2024-06-18 VITALS
WEIGHT: 144 LBS | SYSTOLIC BLOOD PRESSURE: 122 MMHG | HEART RATE: 76 BPM | BODY MASS INDEX: 24.72 KG/M2 | OXYGEN SATURATION: 97 % | DIASTOLIC BLOOD PRESSURE: 68 MMHG

## 2024-06-18 DIAGNOSIS — T07.XXXA MULTIPLE EXCORIATIONS: ICD-10-CM

## 2024-06-18 DIAGNOSIS — L29.9 CHRONIC PRURITUS: Primary | ICD-10-CM

## 2024-06-18 PROCEDURE — 1160F RVW MEDS BY RX/DR IN RCRD: CPT | Performed by: FAMILY MEDICINE

## 2024-06-18 PROCEDURE — 1157F ADVNC CARE PLAN IN RCRD: CPT | Performed by: FAMILY MEDICINE

## 2024-06-18 PROCEDURE — 1036F TOBACCO NON-USER: CPT | Performed by: FAMILY MEDICINE

## 2024-06-18 PROCEDURE — 1123F ACP DISCUSS/DSCN MKR DOCD: CPT | Performed by: FAMILY MEDICINE

## 2024-06-18 PROCEDURE — 1126F AMNT PAIN NOTED NONE PRSNT: CPT | Performed by: FAMILY MEDICINE

## 2024-06-18 PROCEDURE — 3074F SYST BP LT 130 MM HG: CPT | Performed by: FAMILY MEDICINE

## 2024-06-18 PROCEDURE — 1159F MED LIST DOCD IN RCRD: CPT | Performed by: FAMILY MEDICINE

## 2024-06-18 PROCEDURE — 99213 OFFICE O/P EST LOW 20 MIN: CPT | Performed by: FAMILY MEDICINE

## 2024-06-18 PROCEDURE — 3078F DIAST BP <80 MM HG: CPT | Performed by: FAMILY MEDICINE

## 2024-06-18 RX ORDER — METHYLPREDNISOLONE 4 MG/1
TABLET ORAL
Qty: 1 EACH | Refills: 0 | Status: SHIPPED | OUTPATIENT
Start: 2024-06-18

## 2024-06-18 ASSESSMENT — PATIENT HEALTH QUESTIONNAIRE - PHQ9
SUM OF ALL RESPONSES TO PHQ9 QUESTIONS 1 AND 2: 0
1. LITTLE INTEREST OR PLEASURE IN DOING THINGS: NOT AT ALL
2. FEELING DOWN, DEPRESSED OR HOPELESS: NOT AT ALL

## 2024-06-18 ASSESSMENT — PAIN SCALES - GENERAL: PAINLEVEL: 0-NO PAIN

## 2024-06-18 NOTE — PROGRESS NOTES
Subjective   Patient ID: Constance Beltran is a 84 y.o. female who presents for No chief complaint on file..    HPI   Pt comes in today c.o rash over her arms bilaterally She has chronic pruritis for which she takes zyrtec having failed benadryl and hydroxyzine.  She refuses to see derm.    Past Medical History:   Diagnosis Date    Atrophic vulvovaginitis     Cognitive impairment     Decreased renal function     Dyslipidemia     History of type 2 diabetes mellitus 04/2022    Hypertension     Hyperthyroidism     Long term (current) use of opiate analgesic     PM    Osteopenia     Other chronic pain     PM    Prediabetes     Spondylosis without myelopathy or radiculopathy, lumbar region     PM    Thyroid nodule 12/2018    benign per fine needle bx     Current Outpatient Medications   Medication Sig Dispense Refill    cetirizine (ZyrTEC) 10 mg chewable tablet Chew 1 tablet (10 mg) 2 times a day. 60 tablet 0    donepezil (Aricept) 10 mg tablet Take 1 tablet (10 mg) by mouth once daily at bedtime. 30 tablet 11    lisinopril 20 mg tablet Take 1 tablet (20 mg) by mouth once daily. for 90 days 90 tablet 1    methIMAzole (Tapazole) 5 mg tablet TAKE ONE TABLET BY MOUTH TWICE A DAY WITH FOOD 180 tablet 3    pravastatin (Pravachol) 80 mg tablet TAKE ONE TABLET BY MOUTH EVERY DAY 90 tablet 3     No current facility-administered medications for this visit.       Review of Systems see hpi    Objective   There were no vitals taken for this visit.    Physical Exam  Vitals reviewed.   Constitutional:       Appearance: Normal appearance.   Skin:     Comments: Pt has self excoriated scratches on upper arms and lower arms bilaterally.  There are broken blood vessels where she's been scratching.   Neurological:      Mental Status: She is alert.         Assessment/Plan   Problem List Items Addressed This Visit             ICD-10-CM       Medium    Chronic pruritus - Primary L29.9    Relevant Medications    methylPREDNISolone (Medrol Dospak)  4 mg tablets     Other Visit Diagnoses         Codes    Multiple excoriations     T07.XXXA    Relevant Medications    methylPREDNISolone (Medrol Dospak) 4 mg tablets

## 2024-06-21 ENCOUNTER — APPOINTMENT (OUTPATIENT)
Dept: PRIMARY CARE | Facility: CLINIC | Age: 84
End: 2024-06-21
Payer: MEDICARE

## 2024-11-11 DIAGNOSIS — R41.89 COGNITIVE IMPAIRMENT: ICD-10-CM

## 2024-11-11 DIAGNOSIS — E78.2 MIXED HYPERLIPIDEMIA: ICD-10-CM

## 2024-11-11 DIAGNOSIS — E05.90 HYPERTHYROIDISM: ICD-10-CM

## 2024-11-11 DIAGNOSIS — I10 ESSENTIAL HYPERTENSION: ICD-10-CM

## 2024-11-11 RX ORDER — LISINOPRIL 20 MG/1
20 TABLET ORAL DAILY
Qty: 90 TABLET | Refills: 0 | Status: SHIPPED | OUTPATIENT
Start: 2024-11-11 | End: 2025-02-09

## 2024-11-11 RX ORDER — PRAVASTATIN SODIUM 80 MG/1
80 TABLET ORAL DAILY
Qty: 90 TABLET | Refills: 1 | Status: SHIPPED | OUTPATIENT
Start: 2024-11-11 | End: 2025-05-10

## 2024-11-11 RX ORDER — DONEPEZIL HYDROCHLORIDE 10 MG/1
10 TABLET, FILM COATED ORAL NIGHTLY
Qty: 90 TABLET | Refills: 3 | Status: SHIPPED | OUTPATIENT
Start: 2024-11-11

## 2024-11-11 RX ORDER — METHIMAZOLE 5 MG/1
5 TABLET ORAL 2 TIMES DAILY
Qty: 180 TABLET | Refills: 1 | Status: SHIPPED | OUTPATIENT
Start: 2024-11-11

## 2024-11-11 NOTE — TELEPHONE ENCOUNTER
Patient has moved in with her granddaughter in [place] and would like her med's sent to new pharmacy-[facility] [place]  Last OV 6/18/24  Lipid 4/4/24

## 2024-11-16 NOTE — PROGRESS NOTES
HPI:       Hypertension:          The patient has no complaints.          The patients cardiovascular risk factors include:         Cardiac Risk Factors  Age > 45-male, > 55-female:  YES  +1   Smoking:   NO   Sig. family hx of CHD*:  NO   Hypertension:   YES  +1   Diabetes:   NO   HDL < 35:   NO   HDL > 59:   YES  -1   Total: 1     *- Sig. family h/o CHD per NCEP = MI or sudden death at <56yo in   father or other 1st-degree male relative, or <66yo in mother or   other 1st-degree female relative           The patients adherence to the treatment regimen is Excellent         Responses to the medications has been Excellent    Hyperlipidemia:   The patient does not use medications that may worsen dyslipidemias (corticosteroids, progestins, anabolic steroids, diuretics, beta-blockers, amiodarone, cyclosporine, olanzapine). The patient exercises infrequently.  The patient is not known to have coexisting coronary artery disease.  Pt is on a statin.    Also has cognitive dysfunction and is on aricept.  Short term memory is awful and long term memories starting to be affected.   She still drives in [facility] alone.  No driving at night.  Niece is there weekly but pt will forget she's there sometimes.    MEDICATIONS:   Current Outpatient Medications   Medication Sig Dispense Refill    cetirizine (ZyrTEC) 10 mg chewable tablet Chew 1 tablet (10 mg) 2 times a day. 60 tablet 0    donepezil (Aricept) 10 mg tablet Take 1 tablet (10 mg) by mouth once daily at bedtime. 90 tablet 3    lisinopril 20 mg tablet Take 1 tablet (20 mg) by mouth once daily. for 90 days 90 tablet 0    methIMAzole (Tapazole) 5 mg tablet Take 1 tablet (5 mg) by mouth 2 times a day. Take with food. 180 tablet 1    methylPREDNISolone (Medrol Dospak) 4 mg tablets Take each day's dose once a day in the AM with food 1 each 0    pravastatin (Pravachol) 80 mg tablet Take 1 tablet (80 mg) by mouth once daily. 90 tablet 1     No current facility-administered  medications for this visit.     ALLERGIES:   Allergies   Allergen Reactions    Codeine Other     GI SYMPTONS, pills cause GI upset    Atorvastatin Other     MUSCLE WEAKNESS, myalgias    Gemfibrozil Other     Muscle Pain/Myalgia, myalgias    Niacin Hives and Unknown    Simvastatin Other     myalgias     MEDICAL HISTORY:   Past Medical History:   Diagnosis Date    Atrophic vulvovaginitis     Cognitive impairment     Decreased renal function     Dyslipidemia     History of type 2 diabetes mellitus 04/2022    Hypertension     Hyperthyroidism     Long term (current) use of opiate analgesic     PM    Osteopenia     Other chronic pain     PM    Prediabetes     Spondylosis without myelopathy or radiculopathy, lumbar region     PM    Thyroid nodule 12/2018    benign per fine needle bx     FAMILY HISTORY:   Family History   Problem Relation Name Age of Onset    Lung cancer Mother      COPD Brother      Arthritis Brother      No Known Problems Daughter      No Known Problems Son      No Known Problems Son       SOCIAL HISTORY:   Social History     Tobacco Use    Smoking status: Never    Smokeless tobacco: Never   Vaping Use    Vaping status: Never Used   Substance Use Topics    Alcohol use: Never    Drug use: Never         ROS:      CONSTITUTION:  alert and oriented     OPHTHALMOLOGY:          no Change in vision.         CARDIOLOGY:          no Chest pain.  no Dyspnea on exertion.  no Shortness of breath.         ENDOCRINOLOGY:          no Excessive thirst.  no Excessive urination.  no Fatigue.  no Skin changes.  no Weight gain.  no Weight loss.         SKIN:          no Healing problems.         NEUROLOGY:          no Loss of sensation in specific body area.  no Burning pain in feet.  no Peripheral neuropathy.  no Tingling/numbness.    LABS:  Lab Results   Component Value Date    GLUCOSE 107 (H) 04/04/2024    CALCIUM 9.3 04/04/2024     04/04/2024    K 4.1 04/04/2024    CO2 26 04/04/2024     04/04/2024    BUN 12  "04/04/2024    CREATININE 0.90 04/04/2024     Lab Results   Component Value Date    CHOL 175 04/04/2024    CHOL 192 05/01/2023    CHOL 161 07/18/2022     Lab Results   Component Value Date    HDL 54.8 04/04/2024    HDL 60 05/01/2023    HDL 51 07/18/2022     Lab Results   Component Value Date    LDLCALC 86 04/04/2024    LDLCALC 72 05/01/2023    LDLCALC 82 07/18/2022     Lab Results   Component Value Date    TRIG 173 (H) 04/04/2024    TRIG 301 (H) 05/01/2023    TRIG 139 07/18/2022     No components found for: \"CHOLHDL\"  Lab Results   Component Value Date    HGBA1C 5.7 (H) 04/04/2024      Latest Reference Range & Units 04/04/24 14:23   Albumin, Urine Random Not established mg/L 8.2   Creatinine, Urine Random 20.0 - 320.0 mg/dL 177.3   Albumin/Creatinine Ratio <30.0 ug/mg Creat 4.6          VITAL SIGNS:  There were no vitals taken for this visit.    General Appearance: awake, alert, oriented, in no acute distress  Lungs: Normal expansion.  Clear to auscultation.  No rales, rhonchi, or wheezing.  Heart: Heart sounds are normal.  Regular rate and rhythm without murmur, gallop or rub.  Extremities: Extremities warm to touch, pink, with no edema.  Neurologic: Alert and oriented x 3, gait normal., reflexes normal and symmetric, strength and  sensation grossly normal      Diagnoses and all orders for this visit:  Essential hypertension (Primary)  Mixed hyperlipidemia  Stage 3a chronic kidney disease (Multi)  Prediabetes              "

## 2024-12-16 ENCOUNTER — APPOINTMENT (OUTPATIENT)
Dept: PRIMARY CARE | Facility: CLINIC | Age: 84
End: 2024-12-16
Payer: MEDICARE

## 2024-12-16 DIAGNOSIS — I10 ESSENTIAL HYPERTENSION: Primary | ICD-10-CM

## 2024-12-16 DIAGNOSIS — N18.31 STAGE 3A CHRONIC KIDNEY DISEASE (MULTI): ICD-10-CM

## 2024-12-16 DIAGNOSIS — E78.2 MIXED HYPERLIPIDEMIA: ICD-10-CM

## 2024-12-16 DIAGNOSIS — R73.03 PREDIABETES: ICD-10-CM
